# Patient Record
Sex: MALE | Race: WHITE | NOT HISPANIC OR LATINO | Employment: FULL TIME | URBAN - METROPOLITAN AREA
[De-identification: names, ages, dates, MRNs, and addresses within clinical notes are randomized per-mention and may not be internally consistent; named-entity substitution may affect disease eponyms.]

---

## 2018-10-08 PROCEDURE — 99283 EMERGENCY DEPT VISIT LOW MDM: CPT

## 2018-10-09 ENCOUNTER — HOSPITAL ENCOUNTER (EMERGENCY)
Facility: HOSPITAL | Age: 43
Discharge: HOME | End: 2018-10-09
Attending: EMERGENCY MEDICINE | Admitting: EMERGENCY MEDICINE

## 2018-10-09 VITALS
TEMPERATURE: 97.7 F | BODY MASS INDEX: 27.92 KG/M2 | SYSTOLIC BLOOD PRESSURE: 155 MMHG | HEIGHT: 70 IN | DIASTOLIC BLOOD PRESSURE: 100 MMHG | OXYGEN SATURATION: 97 % | RESPIRATION RATE: 18 BRPM | HEART RATE: 68 BPM | WEIGHT: 195 LBS

## 2018-10-09 DIAGNOSIS — I10 ELEVATED BLOOD PRESSURE READING WITH DIAGNOSIS OF HYPERTENSION: ICD-10-CM

## 2018-10-09 DIAGNOSIS — S61.011A LACERATION OF RIGHT THUMB WITHOUT FOREIGN BODY WITHOUT DAMAGE TO NAIL, INITIAL ENCOUNTER: Primary | ICD-10-CM

## 2018-10-09 RX ORDER — AMLODIPINE BESYLATE 10 MG/1
10 TABLET ORAL DAILY
COMMUNITY

## 2018-10-09 RX ORDER — LIDOCAINE HYDROCHLORIDE 5 MG/ML
10 INJECTION, SOLUTION INFILTRATION; PERINEURAL ONCE
Status: COMPLETED | OUTPATIENT
Start: 2018-10-09 | End: 2018-10-09

## 2018-10-09 RX ORDER — LISINOPRIL 5 MG/1
5 TABLET ORAL DAILY
COMMUNITY

## 2018-10-09 RX ADMIN — LIDOCAINE HYDROCHLORIDE 10 ML: 5 INJECTION, SOLUTION INFILTRATION; PERINEURAL at 01:24

## 2018-10-09 NOTE — ED PROVIDER NOTES
Subjective   Mr. Les Sandoval is a 42 y.o. male who presents to the ED for evaluation of a laceration. He reports he was cutting wood with a utility knife 1 hour ago when it slipped and he cut his right thumb. He states the bleed has not been able to be controlled with pressure. His pain is 6/10 in severity. He reports his last tetanus shot was 4 years ago. There are no other acute symptoms at this time.        History provided by:  Patient  Laceration   Location:  Finger  Finger laceration location:  R thumb  Quality: straight    Bleeding: uncontrolled    Time since incident:  1 hour  Laceration mechanism:  Knife  Pain details:     Severity:  Moderate    Timing:  Constant  Relieved by:  Nothing  Worsened by:  Nothing  Ineffective treatments:  Pressure  Tetanus status:  Up to date      Review of Systems    Past Medical History:   Diagnosis Date   • Hypertension        No Known Allergies    History reviewed. No pertinent surgical history.    History reviewed. No pertinent family history.    Social History     Social History   • Marital status: Single     Social History Main Topics   • Smoking status: Never Smoker   • Alcohol use Yes      Comment: 1-2 TIMES WEEKLY   • Drug use: No     Other Topics Concern   • Not on file         Objective   Physical Exam   Constitutional: He is oriented to person, place, and time. He appears well-developed and well-nourished. No distress.   Cardiovascular: Intact distal pulses.    Musculoskeletal: Normal range of motion.   Neurological: He is alert and oriented to person, place, and time.   Skin: Skin is warm and dry. Capillary refill takes less than 2 seconds. Laceration noted.   There is a 7.5 mm laceration on his right thumb with current active bleeding.   Psychiatric: He has a normal mood and affect. His behavior is normal.       Laceration Repair  Date/Time: 10/9/2018 5:40 AM  Performed by: JAMAAL CHING  Authorized by: JAMAAL CHING     Consent:     Consent  obtained:  Verbal    Consent given by:  Patient    Risks discussed:  Infection, pain, retained foreign body, tendon damage, vascular damage, poor cosmetic result, poor wound healing, need for additional repair and nerve damage    Alternatives discussed:  No treatment  Anesthesia (see MAR for exact dosages):     Anesthesia method:  Local infiltration    Local anesthetic:  Lidocaine 1% w/o epi  Laceration details:     Location:  Finger    Finger location:  R thumb    Length (cm):  1  Repair type:     Repair type:  Simple  Pre-procedure details:     Preparation:  Patient was prepped and draped in usual sterile fashion  Exploration:     Wound extent: no foreign bodies/material noted, no muscle damage noted, no nerve damage noted, no tendon damage noted and no vascular damage noted      Contaminated: no    Treatment:     Area cleansed with:  Saline and Betadine    Amount of cleaning:  Standard    Irrigation solution:  Sterile saline    Irrigation method:  Syringe  Skin repair:     Repair method:  Sutures    Suture size:  5-0    Suture material:  Prolene    Suture technique:  Simple interrupted    Number of sutures:  4  Approximation:     Approximation:  Close    Vermilion border: well-aligned    Post-procedure details:     Dressing:  Bulky dressing    Patient tolerance of procedure:  Tolerated well, no immediate complications             ED Course         No results found for this or any previous visit (from the past 24 hour(s)).  Note: In addition to lab results from this visit, the labs listed above may include labs taken at another facility or during a different encounter within the last 24 hours. Please correlate lab times with ED admission and discharge times for further clarification of the services performed during this visit.    No orders to display     Vitals:    10/08/18 2339 10/09/18 0201   BP: (!) 163/105 155/100   BP Location: Left arm    Patient Position: Sitting    Pulse: 70 68   Resp: 16 18   Temp: 97.7  "°F (36.5 °C)    TempSrc: Oral    SpO2: 97% 97%   Weight: 88.5 kg (195 lb)    Height: 177.8 cm (70\")      Medications   lidocaine (XYLOCAINE) injection 10 mL (10 mL Infiltration Given by Other 10/9/18 5484)     ECG/EMG Results (last 24 hours)     ** No results found for the last 24 hours. **                    MDM  Number of Diagnoses or Management Options  Elevated blood pressure reading with diagnosis of hypertension:   Laceration of right thumb without foreign body without damage to nail, initial encounter:       Final diagnoses:   Laceration of right thumb without foreign body without damage to nail, initial encounter   Elevated blood pressure reading with diagnosis of hypertension       Documentation assistance provided by alysia Jose.  Information recorded by the scribe was done at my direction and has been verified and validated by me.     David Jose  10/09/18 0045       David Jose  10/09/18 0155       Enrique Her MD  10/09/18 0542    "

## 2020-02-26 ENCOUNTER — TRANSCRIBE ORDERS (OUTPATIENT)
Dept: ADMINISTRATIVE | Facility: HOSPITAL | Age: 45
End: 2020-02-26

## 2020-02-26 DIAGNOSIS — R10.9 ACUTE RIGHT FLANK PAIN: Primary | ICD-10-CM

## 2020-03-05 ENCOUNTER — HOSPITAL ENCOUNTER (OUTPATIENT)
Dept: CT IMAGING | Facility: HOSPITAL | Age: 45
Discharge: HOME | End: 2020-03-05
Admitting: STUDENT IN AN ORGANIZED HEALTH CARE EDUCATION/TRAINING PROGRAM

## 2020-03-05 DIAGNOSIS — R10.9 ACUTE RIGHT FLANK PAIN: ICD-10-CM

## 2020-03-05 PROCEDURE — 74176 CT ABD & PELVIS W/O CONTRAST: CPT

## 2023-03-30 ENCOUNTER — TRANSCRIBE ORDERS (OUTPATIENT)
Dept: ADMINISTRATIVE | Facility: HOSPITAL | Age: 48
End: 2023-03-30
Payer: COMMERCIAL

## 2023-03-30 DIAGNOSIS — R07.89 CHEST PAIN, ATYPICAL: Primary | ICD-10-CM
